# Patient Record
Sex: MALE | Race: ASIAN | Employment: STUDENT | URBAN - METROPOLITAN AREA
[De-identification: names, ages, dates, MRNs, and addresses within clinical notes are randomized per-mention and may not be internally consistent; named-entity substitution may affect disease eponyms.]

---

## 2021-06-28 ENCOUNTER — TELEPHONE (OUTPATIENT)
Dept: NEUROLOGY | Facility: CLINIC | Age: 21
End: 2021-06-28

## 2021-06-28 NOTE — TELEPHONE ENCOUNTER
pt's mother asked that we req EMG results from [facility] for continuation of care; faxed req to [phone], confirmation rec'd

## 2021-07-08 NOTE — TELEPHONE ENCOUNTER
Pt's mother returned call and stated she is contacting the provider that performed the EMG directly to get results; pt's mother cell phone [phone]

## 2021-07-08 NOTE — TELEPHONE ENCOUNTER
Pt's mother req I call [address] [place] [address] at [phone] to find out the status of EMG report; [facility] states the system says there are no results available. Will check with pt's mother to see where EMG was done.

## 2021-07-08 NOTE — TELEPHONE ENCOUNTER
Their office called back, had only sent half of the records, they faxed the EMG results. The EMG results were placed with the other records in doctor's folder for review. Pt's mother was called to inform that we have the EMG results.

## 2021-07-08 NOTE — TELEPHONE ENCOUNTER
rec'd a Clinical Encounter Summary from [address] [place] [address], dated 4/29/21. Spoke to nurse at Dr. Maranda Anderson office ([phone]) who was going to find the EMG results.

## 2021-07-09 PROBLEM — R07.0 THROAT DISCOMFORT: Status: ACTIVE | Noted: 2021-07-09

## 2021-07-09 PROBLEM — R25.3 MUSCLE TWITCHING: Status: ACTIVE | Noted: 2021-07-09

## 2021-07-09 PROBLEM — F41.8 ANXIETY ABOUT HEALTH: Status: ACTIVE | Noted: 2021-07-09

## 2021-07-09 NOTE — PROGRESS NOTES
[facility] [id number]   Neurology; INITIAL CLINIC VISIT  2021, 9:14 AM     Gordon Estrada Patient Status:  No patient class for patient encounter    10/27/2000 MRN [id number]   Location [address] [place] [address], [address] [place] [address] drugs. ALLERGIES:  No Known Allergies    MEDICATIONS:  No current outpatient medications on file.          REVIEW OF SYSTEMS:    GENERAL HEALTH:  feels well, calm, normal appetite,   EYES: no visual complaints or deficits  HEENT: denies nasal congestion, Nerves       CN II:  Visual fields full, Pupils equal and reactive, Fundi normal       CN III, IV, VI:  Horrizontal and vertical movements normal.  Normal pursuit and saccades.                             No nystagmus, no ptosis       CN V: Masseters strong discussed in details. The patient and family were given ample opportunity to ask questions. All questions and concerns were addressed to satisfactory status.   The patient was instructed to go to local ER if current symptoms worse or significant new symptom